# Patient Record
Sex: MALE | Race: BLACK OR AFRICAN AMERICAN | NOT HISPANIC OR LATINO | Employment: FULL TIME | URBAN - METROPOLITAN AREA
[De-identification: names, ages, dates, MRNs, and addresses within clinical notes are randomized per-mention and may not be internally consistent; named-entity substitution may affect disease eponyms.]

---

## 2024-03-15 ENCOUNTER — APPOINTMENT (OUTPATIENT)
Dept: PRIMARY CARE | Facility: CLINIC | Age: 24
End: 2024-03-15
Payer: COMMERCIAL

## 2024-04-12 ENCOUNTER — LAB (OUTPATIENT)
Dept: LAB | Facility: LAB | Age: 24
End: 2024-04-12
Payer: COMMERCIAL

## 2024-04-12 ENCOUNTER — OFFICE VISIT (OUTPATIENT)
Dept: PRIMARY CARE | Facility: CLINIC | Age: 24
End: 2024-04-12
Payer: COMMERCIAL

## 2024-04-12 VITALS
WEIGHT: 152 LBS | BODY MASS INDEX: 20.15 KG/M2 | DIASTOLIC BLOOD PRESSURE: 90 MMHG | SYSTOLIC BLOOD PRESSURE: 127 MMHG | HEART RATE: 71 BPM | HEIGHT: 73 IN | OXYGEN SATURATION: 97 %

## 2024-04-12 DIAGNOSIS — Z11.59 NEED FOR HEPATITIS B SCREENING TEST: ICD-10-CM

## 2024-04-12 DIAGNOSIS — Z11.3 ROUTINE SCREENING FOR STI (SEXUALLY TRANSMITTED INFECTION): ICD-10-CM

## 2024-04-12 DIAGNOSIS — E55.9 MILD VITAMIN D DEFICIENCY: ICD-10-CM

## 2024-04-12 DIAGNOSIS — Z13.0 SCREENING FOR DEFICIENCY ANEMIA: ICD-10-CM

## 2024-04-12 DIAGNOSIS — Z00.00 HEALTH MAINTENANCE EXAMINATION: Primary | ICD-10-CM

## 2024-04-12 DIAGNOSIS — Z11.4 ENCOUNTER FOR SCREENING FOR HIV: ICD-10-CM

## 2024-04-12 DIAGNOSIS — Z00.00 HEALTH MAINTENANCE EXAMINATION: ICD-10-CM

## 2024-04-12 DIAGNOSIS — Z11.59 ENCOUNTER FOR HEPATITIS C SCREENING TEST FOR LOW RISK PATIENT: ICD-10-CM

## 2024-04-12 DIAGNOSIS — Z13.220 LIPID SCREENING: ICD-10-CM

## 2024-04-12 LAB
25(OH)D3 SERPL-MCNC: 26 NG/ML (ref 30–100)
ALBUMIN SERPL BCP-MCNC: 4.8 G/DL (ref 3.4–5)
ALP SERPL-CCNC: 47 U/L (ref 33–120)
ALT SERPL W P-5'-P-CCNC: 19 U/L (ref 10–52)
ANION GAP SERPL CALC-SCNC: 14 MMOL/L (ref 10–20)
AST SERPL W P-5'-P-CCNC: 22 U/L (ref 9–39)
BILIRUB SERPL-MCNC: 0.6 MG/DL (ref 0–1.2)
BUN SERPL-MCNC: 14 MG/DL (ref 6–23)
CALCIUM SERPL-MCNC: 10.2 MG/DL (ref 8.6–10.6)
CHLORIDE SERPL-SCNC: 101 MMOL/L (ref 98–107)
CHOLEST SERPL-MCNC: 164 MG/DL (ref 0–199)
CHOLESTEROL/HDL RATIO: 2.7
CO2 SERPL-SCNC: 31 MMOL/L (ref 21–32)
CREAT SERPL-MCNC: 1.17 MG/DL (ref 0.5–1.3)
EGFRCR SERPLBLD CKD-EPI 2021: 89 ML/MIN/1.73M*2
ERYTHROCYTE [DISTWIDTH] IN BLOOD BY AUTOMATED COUNT: 12.7 % (ref 11.5–14.5)
GLUCOSE SERPL-MCNC: 83 MG/DL (ref 74–99)
HBV SURFACE AG SERPL QL IA: NONREACTIVE
HCT VFR BLD AUTO: 41.1 % (ref 41–52)
HCV AB SER QL: NONREACTIVE
HDLC SERPL-MCNC: 60.4 MG/DL
HGB BLD-MCNC: 14.2 G/DL (ref 13.5–17.5)
HIV 1+2 AB+HIV1 P24 AG SERPL QL IA: NONREACTIVE
LDLC SERPL CALC-MCNC: 95 MG/DL
MCH RBC QN AUTO: 30.7 PG (ref 26–34)
MCHC RBC AUTO-ENTMCNC: 34.5 G/DL (ref 32–36)
MCV RBC AUTO: 89 FL (ref 80–100)
NON HDL CHOLESTEROL: 104 MG/DL (ref 0–149)
NRBC BLD-RTO: 0 /100 WBCS (ref 0–0)
PLATELET # BLD AUTO: 242 X10*3/UL (ref 150–450)
POTASSIUM SERPL-SCNC: 4.6 MMOL/L (ref 3.5–5.3)
PROT SERPL-MCNC: 7.6 G/DL (ref 6.4–8.2)
RBC # BLD AUTO: 4.62 X10*6/UL (ref 4.5–5.9)
SODIUM SERPL-SCNC: 141 MMOL/L (ref 136–145)
TREPONEMA PALLIDUM IGG+IGM AB [PRESENCE] IN SERUM OR PLASMA BY IMMUNOASSAY: NONREACTIVE
TRIGL SERPL-MCNC: 43 MG/DL (ref 0–149)
TSH SERPL-ACNC: 3.26 MIU/L (ref 0.44–3.98)
VLDL: 9 MG/DL (ref 0–40)
WBC # BLD AUTO: 10.9 X10*3/UL (ref 4.4–11.3)

## 2024-04-12 PROCEDURE — 36415 COLL VENOUS BLD VENIPUNCTURE: CPT

## 2024-04-12 PROCEDURE — 86780 TREPONEMA PALLIDUM: CPT

## 2024-04-12 PROCEDURE — 85027 COMPLETE CBC AUTOMATED: CPT

## 2024-04-12 PROCEDURE — 86803 HEPATITIS C AB TEST: CPT

## 2024-04-12 PROCEDURE — 87340 HEPATITIS B SURFACE AG IA: CPT

## 2024-04-12 PROCEDURE — 80053 COMPREHEN METABOLIC PANEL: CPT

## 2024-04-12 PROCEDURE — 3008F BODY MASS INDEX DOCD: CPT | Performed by: STUDENT IN AN ORGANIZED HEALTH CARE EDUCATION/TRAINING PROGRAM

## 2024-04-12 PROCEDURE — 82306 VITAMIN D 25 HYDROXY: CPT

## 2024-04-12 PROCEDURE — 84443 ASSAY THYROID STIM HORMONE: CPT

## 2024-04-12 PROCEDURE — 87800 DETECT AGNT MULT DNA DIREC: CPT

## 2024-04-12 PROCEDURE — 80061 LIPID PANEL: CPT

## 2024-04-12 PROCEDURE — 87389 HIV-1 AG W/HIV-1&-2 AB AG IA: CPT

## 2024-04-12 PROCEDURE — 99385 PREV VISIT NEW AGE 18-39: CPT | Performed by: STUDENT IN AN ORGANIZED HEALTH CARE EDUCATION/TRAINING PROGRAM

## 2024-04-12 NOTE — PATIENT INSTRUCTIONS
Labs in [address] today--both blood and urine testing    Flu, updated COVID, and Tdap vaccines this fall     Congratulations on the new baby!    Weight bearing exercises at home  Get some dumbbells   Consider the HICKS protein powder  Start a Men's Daily Multivitamin     Follow up annually, sooner if any issues arise!    Best,  Dr. MARSHALL

## 2024-04-12 NOTE — PROGRESS NOTES
"Domitila Baca is a 23 y.o. male seen in Clinic at /[facility] by Dr. Don Cox on 04/12/24 for routine care, as well as for management of the following chronic medical conditions: none. Patient presents today to establish care and for CPE.     #Low Weight/Low BMI concerns per patient --> NORMAL BMI 20   - eats three meals per day, no food insecurity   - has not lost any weight   - around 150 lbs; highest weight around 160 in the past at max but overall stable for last 10 years  - no weight loss  - no fevers/chills, night sweats, rashes  - 30+ lifetime sexual partners, often without protection   - reassuring physical exam  [  ] lab screening today   [  ] health lifestyle habits     Past Medical History: as above   No past medical history on file.    Past Surgical History: oral surgery in childhood   No past surgical history on file.    Medications:   No current outpatient medications on file.  [facility]: [facility]     Allergies: NKDA   No Known Allergies    Immunizations:   - recommend annual Flu shot   - believes he got Tdap prior to starting to work at  (2021)   - recommend staying UTD   - HPV vaccinated  - Men A, Hep A vaccinated     Family History:   - not that patient is aware of   No family history on file.    Social History:   Home/Living Situation: from [place], lives at home with parents  Education: [employer]  Employment/Work/Vocational: works in [employer]   Activities: regular physical activity discussed   Drug Use: no use currently  Diet: healthy dietary habits discussed, no food insecurity   Sexuality/Contraception/Menstrual History: one stable partner; around 30 lifetime partners; limited condom use  Suicide/Depression/Anxiety: no concerns   Sleep: sleeps well   Legal/Guardian/Emergency Contact: motherSharon  Contact Information: correct in EMR    Visit Vitals  /90   Pulse 71   Ht 1.82 m (5' 11.65\")   Wt 68.9 kg (152 lb)   SpO2 97%   BMI 20.81 kg/m²   BSA 1.87 m²    "   PHYSICAL EXAM:   General: well appearing AA male, NAD, pleasant and engaged in encounter    HEENT: NCAT, MMM  CV: RRR, no m/r/g  CHEST: slight pectus noted on chest exam (asymptomatic with activity per patient)   PULM: CTAB, non-labored respirations   ABD: soft, NT, ND, + bowel sounds   : no suprapubic or CVA tenderness   EXT: WWP, no significant edema   SKIN: no rashes noted, skin tattoos noted   NEURO: A&Ox4, symmetric facies, no gross motor or sensory deficits, normal gait  PSYCH: pleasant mood, appropriate affect     Assessment/Plan    Domitila Baca is a 23 y.o. male seen in Clinic at /[facility] by Dr. Don Cox on 04/12/24 for routine care, as well as for management of the following chronic medical conditions: none. Patient presents today to establish care and for CPE.     #Low Weight/Low BMI concerns per patient --> NORMAL BMI 20   - eats three meals per day, no food insecurity   - has not lost any weight   - around 150 lbs; highest weight around 160 in the past at max but overall stable for last 10 years  - no weight loss  - no fevers/chills, night sweats, rashes  - 30+ lifetime sexual partners, often without protection   - reassuring physical exam  [  ] lab screening today   [  ] health lifestyle habits     #Health Maintenance   - Vision, Hearing screens: no concerns today   - Counseling regarding alcohol/tobacco/drug use: no use at present  - Depression screen: negative   - BMI, Lipid, A1C screening and nutritional/exercise counseling: labs today   - Blood Pressure: reassuring   - Safe Sexual Practices, STI, HIV screening: screening today      #Transition of Care/Young Adult Care  - Health Literacy Assessment: adequate   - Medications: Active medications reviewed and updated  - Allergies: Reviewed and updated   - Immunizations: staying UTD with flu, COVID recommended; advised updated Tdap if unable to verify last   - Identified Adult or Subspecialty Providers: Dr. Don Cox  - Resources  Provided: labs     Return to clinic annually for follow-up/CPE, sooner if acute issues arise.     Don Cox MD  Internal Medicine-Pediatrics   [facility] [address], [address]  P: [phone], F: [phone]

## 2024-04-13 LAB
C TRACH RRNA SPEC QL NAA+PROBE: NEGATIVE
N GONORRHOEA DNA SPEC QL PROBE+SIG AMP: NEGATIVE